# Patient Record
Sex: FEMALE | URBAN - METROPOLITAN AREA
[De-identification: names, ages, dates, MRNs, and addresses within clinical notes are randomized per-mention and may not be internally consistent; named-entity substitution may affect disease eponyms.]

---

## 2022-08-13 ENCOUNTER — APPOINTMENT (RX ONLY)
Dept: URBAN - METROPOLITAN AREA CLINIC 55 | Facility: CLINIC | Age: 47
Setting detail: DERMATOLOGY
End: 2022-08-13

## 2022-08-13 DIAGNOSIS — Z41.9 ENCOUNTER FOR PROCEDURE FOR PURPOSES OTHER THAN REMEDYING HEALTH STATE, UNSPECIFIED: ICD-10-CM

## 2022-08-13 PROCEDURE — ? DYSPORT

## 2022-08-13 PROCEDURE — ? INVENTORY

## 2022-08-13 PROCEDURE — ? COSMETIC CONSULTATION: GENERAL

## 2022-08-13 NOTE — PROCEDURE: DYSPORT
Detail Level: Detailed
Levator Labii Superioris Units: 0
Show Topical Anesthesia: Yes
Show Right And Left Periorbital Units: No
Periorbital Skin Units: 12
Show Inventory Tab: Show
Consent obtained. Risks include but not limited to lid/brow ptosis, bruising, swelling, diplopia, temporary effect, incomplete chemical denervation.
Dilution (U/0.1 Cc): 10
Glabellar Complex Units: 16
Post-Care Instructions: Patient instructed to not lie down for 4 hours and limit physical activity for 24 hours.

## 2022-09-08 ENCOUNTER — APPOINTMENT (RX ONLY)
Dept: URBAN - METROPOLITAN AREA CLINIC 55 | Facility: CLINIC | Age: 47
Setting detail: DERMATOLOGY
End: 2022-09-08

## 2022-09-08 DIAGNOSIS — Z41.9 ENCOUNTER FOR PROCEDURE FOR PURPOSES OTHER THAN REMEDYING HEALTH STATE, UNSPECIFIED: ICD-10-CM

## 2022-09-08 PROCEDURE — ? INVENTORY

## 2022-09-08 PROCEDURE — ? COSMETIC CONSULTATION: GENERAL

## 2022-09-09 ENCOUNTER — APPOINTMENT (RX ONLY)
Dept: URBAN - METROPOLITAN AREA CLINIC 55 | Facility: CLINIC | Age: 47
Setting detail: DERMATOLOGY
End: 2022-09-09

## 2022-09-09 DIAGNOSIS — Z41.9 ENCOUNTER FOR PROCEDURE FOR PURPOSES OTHER THAN REMEDYING HEALTH STATE, UNSPECIFIED: ICD-10-CM

## 2022-09-09 PROCEDURE — ? INVENTORY

## 2022-09-09 PROCEDURE — ? PALOMAR VECTUS LASER HAIR REMOVAL

## 2022-09-09 NOTE — PROCEDURE: PALOMAR VECTUS LASER HAIR REMOVAL
Location Override: devendra
Rate (Hz): Medium
Fluence In J/Cm2: 26
Laser Type: diode 810nm
Post-Care Instructions: I reviewed with the patient in detail post-care instructions. Patient should avoid sun for a minimum of 4 weeks before and after treatment.
Cooling: DCD setting
Skintel Number (Optional): 12
Pulse Duration (Include Units): 17 ms
Render Post-Care In The Note: No
Pre-Procedure: Prior to proceeding the treatment areas were cleaned and all present put on their eye protection.
Treatment Number: 1
Topical Anesthesia Type: 20% benzocaine, 8% lidocaine, 4% tetracaine
Consent obtained, risks reviewed.
External Cooling Fan Speed: 0
Post-Procedure Care: Immediate endpoint: perifollicular erythema and edema. Post care was reviewed with the patient and all patient questions were answered to their satisfaction.
Detail Level: Detailed
Optic Size: 12 x 12mm

## 2022-10-21 ENCOUNTER — APPOINTMENT (RX ONLY)
Dept: URBAN - METROPOLITAN AREA CLINIC 55 | Facility: CLINIC | Age: 47
Setting detail: DERMATOLOGY
End: 2022-10-21

## 2022-10-21 DIAGNOSIS — Z41.9 ENCOUNTER FOR PROCEDURE FOR PURPOSES OTHER THAN REMEDYING HEALTH STATE, UNSPECIFIED: ICD-10-CM

## 2022-10-21 PROCEDURE — ? PALOMAR VECTUS LASER HAIR REMOVAL

## 2022-10-21 PROCEDURE — ? INVENTORY

## 2022-10-21 NOTE — PROCEDURE: PALOMAR VECTUS LASER HAIR REMOVAL
Location Override: devendra
Pulse Duration (Include Units): 18 ms
Consent obtained, risks reviewed.
Skintel Number (Optional): 12
Optic Size: 12 x 12mm
Rate (Hz): Medium
Render Post-Care In The Note: No
Cooling: DCD setting
Treatment Number: 2
Fluence In J/Cm2: 28
Post-Procedure Care: Immediate endpoint: perifollicular erythema and edema. Post care was reviewed with the patient and all patient questions were answered to their satisfaction.
Detail Level: Detailed
External Cooling Fan Speed: 0
Post-Care Instructions: I reviewed with the patient in detail post-care instructions. Patient should avoid sun for a minimum of 4 weeks before and after treatment.
Pre-Procedure: Prior to proceeding the treatment areas were cleaned and all present put on their eye protection.
Laser Type: diode 810nm
Topical Anesthesia Type: 20% benzocaine, 8% lidocaine, 4% tetracaine

## 2022-10-27 ENCOUNTER — APPOINTMENT (RX ONLY)
Dept: URBAN - METROPOLITAN AREA CLINIC 55 | Facility: CLINIC | Age: 47
Setting detail: DERMATOLOGY
End: 2022-10-27

## 2022-10-27 DIAGNOSIS — Z41.9 ENCOUNTER FOR PROCEDURE FOR PURPOSES OTHER THAN REMEDYING HEALTH STATE, UNSPECIFIED: ICD-10-CM

## 2022-10-27 PROCEDURE — ? INVENTORY

## 2022-10-27 PROCEDURE — ? PALOMAR VECTUS LASER HAIR REMOVAL

## 2022-10-27 NOTE — PROCEDURE: PALOMAR VECTUS LASER HAIR REMOVAL
Location Override: bikini
Consent obtained, risks reviewed.
Skintel Number (Optional): 14
Topical Anesthesia Type: 20% benzocaine, 8% lidocaine, 4% tetracaine
Pre-Procedure: Prior to proceeding the treatment areas were cleaned and all present put on their eye protection.
Laser Type: diode 810nm
Treatment Number: 1
Post-Procedure Care: Immediate endpoint: perifollicular erythema and edema. Post care was reviewed with the patient and all patient questions were answered to their satisfaction.
Post-Care Instructions: I reviewed with the patient in detail post-care instructions. Patient should avoid sun for a minimum of 4 weeks before and after treatment.
External Cooling Fan Speed: 0
Fluence In J/Cm2: 24
Cooling: DCD setting
Pulse Duration (Include Units): 16 ms
Optic Size: 12 x 12mm
Rate (Hz): Medium
Render Post-Care In The Note: No
Detail Level: Detailed

## 2022-11-11 ENCOUNTER — APPOINTMENT (RX ONLY)
Dept: URBAN - METROPOLITAN AREA CLINIC 55 | Facility: CLINIC | Age: 47
Setting detail: DERMATOLOGY
End: 2022-11-11

## 2022-11-11 DIAGNOSIS — Z41.9 ENCOUNTER FOR PROCEDURE FOR PURPOSES OTHER THAN REMEDYING HEALTH STATE, UNSPECIFIED: ICD-10-CM

## 2022-11-11 PROCEDURE — ? DYSPORT

## 2022-11-11 PROCEDURE — ? INVENTORY

## 2022-11-11 NOTE — PROCEDURE: DYSPORT
Periorbital Skin Units: 30
Show Topical Anesthesia: Yes
Additional Area 2 Units: 0
Show Ucl Units: No
Detail Level: Detailed
Additional Area 1 Location: lip
Dilution (U/0.1 Cc): 10
Glabellar Complex Units: 40
Show Inventory Tab: Show
Consent obtained. Risks include but not limited to lid/brow ptosis, bruising, swelling, diplopia, temporary effect, incomplete chemical denervation.
Post-Care Instructions: Patient instructed to not lie down for 4 hours and limit physical activity for 24 hours.

## 2022-12-20 ENCOUNTER — APPOINTMENT (RX ONLY)
Dept: URBAN - METROPOLITAN AREA CLINIC 55 | Facility: CLINIC | Age: 47
Setting detail: DERMATOLOGY
End: 2022-12-20

## 2022-12-20 DIAGNOSIS — Z41.9 ENCOUNTER FOR PROCEDURE FOR PURPOSES OTHER THAN REMEDYING HEALTH STATE, UNSPECIFIED: ICD-10-CM

## 2022-12-20 PROCEDURE — ? PALOMAR VECTUS LASER HAIR REMOVAL

## 2022-12-20 PROCEDURE — ? INVENTORY

## 2022-12-20 NOTE — PROCEDURE: PALOMAR VECTUS LASER HAIR REMOVAL
Pre-Procedure: Prior to proceeding the treatment areas were cleaned and all present put on their eye protection.
Topical Anesthesia Type: 20% benzocaine, 8% lidocaine, 4% tetracaine
Location Override: [place] (bikini treated at L[facility])
Post-Care Instructions: I reviewed with the patient in detail post-care instructions. Patient should avoid sun for a minimum of 4 weeks before and after treatment.
Skintel Number (Optional): 17
External Cooling Fan Speed: 0
Laser Type: diode 810nm
Post-Procedure Care: Immediate endpoint: perifollicular erythema and edema. Post care was reviewed with the patient and all patient questions were answered to their satisfaction.
Detail Level: Detailed
Render Post-Care In The Note: No
Treatment Number: 1
Fluence In J/Cm2: 24
Cooling: DCD setting
Consent obtained, risks reviewed.
Rate (Hz): Medium
Pulse Duration (Include Units): 16 ms
Optic Size: 12 x 12mm
Location Override: devendra
Fluence In J/Cm2: 29
Pulse Duration (Include Units): 19 ms
Treatment Number: 3

## 2023-01-27 ENCOUNTER — APPOINTMENT (RX ONLY)
Dept: URBAN - METROPOLITAN AREA CLINIC 55 | Facility: CLINIC | Age: 48
Setting detail: DERMATOLOGY
End: 2023-01-27

## 2023-01-27 DIAGNOSIS — Z41.9 ENCOUNTER FOR PROCEDURE FOR PURPOSES OTHER THAN REMEDYING HEALTH STATE, UNSPECIFIED: ICD-10-CM

## 2023-01-27 PROCEDURE — ? INVENTORY

## 2023-01-27 PROCEDURE — ? PALOMAR VECTUS LASER HAIR REMOVAL

## 2023-01-27 NOTE — PROCEDURE: PALOMAR VECTUS LASER HAIR REMOVAL
Rate (Hz): Medium
Fluence In J/Cm2: 30
Laser Type: diode 810nm
Topical Anesthesia Type: 20% benzocaine, 8% lidocaine, 4% tetracaine
Pre-Procedure: Prior to proceeding the treatment areas were cleaned and all present put on their eye protection.
Cooling: DCD setting
Skintel Number (Optional): 18
Treatment Number: 4
Render Post-Care In The Note: No
Optic Size: 12 x 12mm
Consent obtained, risks reviewed.
External Cooling Fan Speed: 0
Post-Procedure Care: Immediate endpoint: perifollicular erythema and edema. Post care was reviewed with the patient and all patient questions were answered to their satisfaction.
Pulse Duration (Include Units): 20 ms
Post-Care Instructions: I reviewed with the patient in detail post-care instructions. Patient should avoid sun for a minimum of 4 weeks before and after treatment.
Detail Level: Detailed
Location Override: devendra
Treatment Number: 2
Location Override: [place]razilian
Fluence In J/Cm2: 11
Pulse Duration (Include Units): 41 ms
Skintel Number (Optional): 17

## 2023-03-07 ENCOUNTER — APPOINTMENT (RX ONLY)
Dept: URBAN - METROPOLITAN AREA CLINIC 55 | Facility: CLINIC | Age: 48
Setting detail: DERMATOLOGY
End: 2023-03-07

## 2023-03-07 DIAGNOSIS — Z41.9 ENCOUNTER FOR PROCEDURE FOR PURPOSES OTHER THAN REMEDYING HEALTH STATE, UNSPECIFIED: ICD-10-CM

## 2023-03-07 PROCEDURE — ? PALOMAR VECTUS LASER HAIR REMOVAL

## 2023-03-07 PROCEDURE — ? INVENTORY

## 2023-03-07 NOTE — PROCEDURE: PALOMAR VECTUS LASER HAIR REMOVAL
Post-Care Instructions: I reviewed with the patient in detail post-care instructions. Patient should avoid sun for a minimum of 4 weeks before and after treatment.
Cooling: DCD setting
Rate (Hz): Medium
Detail Level: Detailed
Treatment Number: 3
Location Override: [place]razilian
Pulse Duration (Include Units): 45 ms
Optic Size: 12 x 12mm
Skintel Number (Optional): 18
Laser Type: diode 810nm
Consent obtained, risks reviewed.
Post-Procedure Care: Immediate endpoint: perifollicular erythema and edema. Post care was reviewed with the patient and all patient questions were answered to their satisfaction.
Fluence In J/Cm2: 12
Pre-Procedure: Prior to proceeding the treatment areas were cleaned and all present put on their eye protection.
Topical Anesthesia Type: 20% benzocaine, 8% lidocaine, 4% tetracaine
External Cooling Fan Speed: 0
Render Post-Care In The Note: No
Treatment Number: 5
Skintel Number (Optional): 16
Pulse Duration (Include Units): 20 ms
Location Override: devendra
Fluence In J/Cm2: 31

## 2023-03-13 ENCOUNTER — APPOINTMENT (RX ONLY)
Dept: URBAN - METROPOLITAN AREA CLINIC 55 | Facility: CLINIC | Age: 48
Setting detail: DERMATOLOGY
End: 2023-03-13

## 2023-03-13 DIAGNOSIS — Z41.9 ENCOUNTER FOR PROCEDURE FOR PURPOSES OTHER THAN REMEDYING HEALTH STATE, UNSPECIFIED: ICD-10-CM

## 2023-03-13 PROCEDURE — ? DYSPORT

## 2023-03-13 NOTE — PROCEDURE: DYSPORT
Dilution (U/0.1 Cc): 10
Show Right And Left Periorbital Units: No
Additional Area 2 Location: chin
Show Depressor Anguli Units: Yes
Depressor Anguli Oris Units: 0
Consent: Verbal consent obtained. Risks include but not limited to lid/brow ptosis, bruising, swelling, diplopia, temporary effect, incomplete chemical denervation.
Post-Care Instructions: Patient instructed to not lie down for 4 hours and limit physical activity for 24 hours.
Periorbital Skin Units: 30
Detail Level: Detailed
Glabellar Complex Units: 40
Additional Area 1 Location: Upper and lower lip
Show Inventory Tab: Show

## 2023-04-20 ENCOUNTER — APPOINTMENT (RX ONLY)
Dept: URBAN - METROPOLITAN AREA CLINIC 55 | Facility: CLINIC | Age: 48
Setting detail: DERMATOLOGY
End: 2023-04-20

## 2023-04-20 DIAGNOSIS — Z41.9 ENCOUNTER FOR PROCEDURE FOR PURPOSES OTHER THAN REMEDYING HEALTH STATE, UNSPECIFIED: ICD-10-CM

## 2023-04-20 PROCEDURE — ? INVENTORY

## 2023-04-20 PROCEDURE — ? PALOMAR VECTUS LASER HAIR REMOVAL

## 2023-04-20 NOTE — PROCEDURE: PALOMAR VECTUS LASER HAIR REMOVAL
External Cooling Fan Speed: 0
Render Post-Care In The Note: No
Location Override: brazilian
Detail Level: Detailed
Post-Procedure Care: Immediate endpoint: perifollicular erythema and edema. Post care was reviewed with the patient and all patient questions were answered to their satisfaction.
Skintel Number (Optional): 18
Fluence In J/Cm2: 13
Laser Type: diode 810nm
Cooling: DCD setting
Rate (Hz): Medium
Consent obtained, risks reviewed.
Treatment Number: 4
Topical Anesthesia Type: 20% benzocaine, 8% lidocaine, 4% tetracaine
Post-Care Instructions: I reviewed with the patient in detail post-care instructions. Patient should avoid sun for a minimum of 4 weeks before and after treatment.
Pre-Procedure: Prior to proceeding the treatment areas were cleaned and all present put on their eye protection.
Optic Size: 12 x 12mm
Pulse Duration (Include Units): 49 ms

## 2023-06-19 ENCOUNTER — APPOINTMENT (RX ONLY)
Dept: URBAN - METROPOLITAN AREA CLINIC 55 | Facility: CLINIC | Age: 48
Setting detail: DERMATOLOGY
End: 2023-06-19

## 2023-06-19 DIAGNOSIS — Z41.9 ENCOUNTER FOR PROCEDURE FOR PURPOSES OTHER THAN REMEDYING HEALTH STATE, UNSPECIFIED: ICD-10-CM

## 2023-06-19 PROCEDURE — ? PALOMAR VECTUS LASER HAIR REMOVAL

## 2023-06-19 PROCEDURE — ? INVENTORY

## 2023-06-19 NOTE — PROCEDURE: PALOMAR VECTUS LASER HAIR REMOVAL
Fluence In J/Cm2: 13
Skintel Number (Optional): 15
Post-Care Instructions: I reviewed with the patient in detail post-care instructions. Patient should avoid sun for a minimum of 4 weeks before and after treatment.
Cooling: DCD setting
Laser Type: diode 810nm
Topical Anesthesia Type: 20% benzocaine, 8% lidocaine, 4% tetracaine
Rate (Hz): Medium
Pre-Procedure: Prior to proceeding the treatment areas were cleaned and all present put on their eye protection.
Treatment Number: 5
Render Post-Care In The Note: No
External Cooling Fan Speed: 0
Optic Size: 12 x 12mm
Detail Level: Detailed
Pulse Duration (Include Units): 49 ms
Location Override: Brazilian
Post-Procedure Care: Immediate endpoint: perifollicular erythema and edema. Post care was reviewed with the patient and all patient questions were answered to their satisfaction.
Consent obtained, risks reviewed.

## 2023-08-01 ENCOUNTER — APPOINTMENT (RX ONLY)
Dept: URBAN - METROPOLITAN AREA CLINIC 55 | Facility: CLINIC | Age: 48
Setting detail: DERMATOLOGY
End: 2023-08-01

## 2023-08-01 DIAGNOSIS — Z41.9 ENCOUNTER FOR PROCEDURE FOR PURPOSES OTHER THAN REMEDYING HEALTH STATE, UNSPECIFIED: ICD-10-CM

## 2023-08-01 PROCEDURE — ? PALOMAR VECTUS LASER HAIR REMOVAL

## 2023-08-01 PROCEDURE — ? INVENTORY

## 2023-08-01 NOTE — PROCEDURE: PALOMAR VECTUS LASER HAIR REMOVAL
Skintel Number (Optional): 14
External Cooling Fan Speed: 0
Post-Care Instructions: I reviewed with the patient in detail post-care instructions. Patient should avoid sun for a minimum of 4 weeks before and after treatment.
Fluence In J/Cm2: 13
Treatment Number: 6
Post-Procedure Care: Immediate endpoint: perifollicular erythema and edema. Post care was reviewed with the patient and all patient questions were answered to their satisfaction.
Cooling: DCD setting
Rate (Hz): Medium
Detail Level: Detailed
Optic Size: 12 x 12mm
Render Post-Care In The Note: No
Pulse Duration (Include Units): 49 ms
Location Override: [place]- perimeter
Consent obtained, risks reviewed.
Laser Type: diode 810nm
Topical Anesthesia Type: 20% benzocaine, 8% lidocaine, 4% tetracaine
Pre-Procedure: Prior to proceeding the treatment areas were cleaned and all present put on their eye protection.
Pulse Duration (Include Units): 18 ms
Location Override: labia and gluteal cleft
Fluence In J/Cm2: 27

## 2023-09-28 ENCOUNTER — APPOINTMENT (RX ONLY)
Dept: URBAN - METROPOLITAN AREA CLINIC 55 | Facility: CLINIC | Age: 48
Setting detail: DERMATOLOGY
End: 2023-09-28

## 2023-09-28 DIAGNOSIS — Z41.9 ENCOUNTER FOR PROCEDURE FOR PURPOSES OTHER THAN REMEDYING HEALTH STATE, UNSPECIFIED: ICD-10-CM

## 2023-09-28 PROCEDURE — ? INVENTORY

## 2023-09-28 PROCEDURE — ? PALOMAR VECTUS LASER HAIR REMOVAL

## 2023-09-28 NOTE — PROCEDURE: PALOMAR VECTUS LASER HAIR REMOVAL
Render Post-Care In The Note: No
Topical Anesthesia Type: 20% benzocaine, 8% lidocaine, 4% tetracaine
External Cooling Fan Speed: 0
Pre-Procedure: Prior to proceeding the treatment areas were cleaned and all present put on their eye protection.
Consent obtained, risks reviewed.
No
Detail Level: Detailed
Treatment Number: 6
Fluence In J/Cm2: 14
Pulse Duration (Include Units): 54
Location Override: [place]- [facility] only
Post-Care Instructions: I reviewed with the patient in detail post-care instructions. Patient should avoid sun for a minimum of 4 weeks before and after treatment.
Rate (Hz): Medium
Optic Size: 12 x 12mm
Laser Type: diode 810nm
Skintel Number (Optional): 13
Cooling: DCD setting
Post-Procedure Care: Immediate endpoint: perifollicular erythema and edema. Post care was reviewed with the patient and all patient questions were answered to their satisfaction.
Fluence In J/Cm2: 28
Pulse Duration (Include Units): 18 ms
Location Override: labia and gluteal cleft